# Patient Record
Sex: MALE | Race: WHITE
[De-identification: names, ages, dates, MRNs, and addresses within clinical notes are randomized per-mention and may not be internally consistent; named-entity substitution may affect disease eponyms.]

---

## 2018-09-21 ENCOUNTER — HOSPITAL ENCOUNTER (EMERGENCY)
Dept: HOSPITAL 25 - UCCORT | Age: 73
Discharge: HOME HEALTH | End: 2018-09-21
Payer: MEDICARE

## 2018-09-21 VITALS — SYSTOLIC BLOOD PRESSURE: 111 MMHG | DIASTOLIC BLOOD PRESSURE: 62 MMHG

## 2018-09-21 DIAGNOSIS — Z87.891: Primary | ICD-10-CM

## 2018-09-21 PROCEDURE — 74176 CT ABD & PELVIS W/O CONTRAST: CPT

## 2018-09-21 PROCEDURE — 76705 ECHO EXAM OF ABDOMEN: CPT

## 2018-09-21 PROCEDURE — 99212 OFFICE O/P EST SF 10 MIN: CPT

## 2018-09-21 PROCEDURE — G0463 HOSPITAL OUTPT CLINIC VISIT: HCPCS

## 2018-09-21 PROCEDURE — 81003 URINALYSIS AUTO W/O SCOPE: CPT

## 2018-09-21 NOTE — RAD
Indication: Right upper quadrant pain.



Real-time sonography of the right upper quadrant was performed.



The liver measures 14.6 cm in length. No focal lesions or intrahepatic duct dilatation is

noted. The gallbladder demonstrates no calcified gallstones. No pericholecystic fluid or

wall thickening is noted.



The right kidney measures 13.4 x 6.2 x 6.3 cm with moderate degree of right

hydronephrosis. A definitive calculi is not identified.



The pancreas head, neck and proximal body demonstrates no mass or pancreatic duct

dilatation. Aorta and inferior vena cava are unremarkable. Ureteral jets are not

identified in the urinary bladder.



IMPRESSION: No evidence of cholelithiasis or biliary duct dilatation.



Moderate right hydronephrosis and hydroureter. Ureteral jets are identified in the urinary

bladder.

## 2018-09-21 NOTE — RAD
INDICATION: RIGHT hydronephrosis on ultrasound. Question stone.



COMPARISON: September 21, 2018 ultrasound.



TECHNIQUE: Multidetector CT images were obtained from the lung bases to the ischial

tuberosities. No oral contrast administered.  Assessment of the visceral limited without

IV contrast. Multiplanar reformation.



REPORT: 



VISUALIZED INFERIOR THORAX: Small calcified granuloma at the RIGHT lower lobe. Mild RIGHT

basilar linear atelectasis.



LIVER / GALLBLADDER / PANCREAS / SPLEEN: Unremarkable unenhanced liver, gallbladder,

spleen. Mildly atrophic pancreas without suspicious finding.



ALIMENTARY TRACT: No CT abnormality of the upper GI or small bowel. The appendix is not

visualized. Mild diverticulosis of the colon without findings of acute diverticulitis.

Small volume of free pelvic fluid. Negative for free air. Small fat-containing umbilical

and bilateral inguinal hernias without inflammatory change.



MESENTERIC: Unremarkable.



ADRENAL / GENITOURINARY: Normal adrenal glands. Severe RIGHT hydroureteronephrosis is

traced 0.5 cm proximal RIGHT ureteral stone at the L4 level. Moderate RIGHT perinephric

and periureteral inflammatory stranding. Moderate LEFT pelvicaliectasis appears secondary

to chronic ureteropelvic junction stenosis. Negative for LEFT perinephric stranding.

Partially distended urinary bladder with multiple dependent stones measuring up to 1.3 cm.

Prostatomegaly and asymmetric prominence of the RIGHT seminal vesicle with lobular

contour. 



RETROPERITONEAL: Negative for lymphadenopathy.



VASCULAR: Negative for aortoiliac aneurysm. Physiologic partial distention of the IVC.

 

BONES: Negative for suspicious focal osseous lesions. Polyarticular degenerative

arthropathy. Grade 1 degenerative L1-L2, L3-L4 retrolisthesis and grade 1 degenerative

L4-L5 anterolisthesis.



SOFT TISSUE: Unremarkable. 



IMPRESSION: 

#. Severe RIGHT hydroureteronephrosis is traced 0.5 cm proximal RIGHT ureteral stone at

the L4 level. 

#. Moderate LEFT pelvicaliectasis appears secondary to chronic ureteropelvic junction

stenosis. 

#. Partially distended urinary bladder with multiple dependent stones measuring up to 1.3

cm. 

#. Prostatomegaly and asymmetric prominence of the RIGHT seminal vesicle with lobular

contour. Correlate with clinical assessment.

## 2018-09-21 NOTE — UC
Abdominal Pain Male HPI





- HPI Summary


HPI Summary: 





Pt presetns with 3 days right side abdominal pain, nause and vomiting. Pt 

states yesterday ate pasta at noon. at 3pm pt with increased pain and vomiting. 

States settled last evening and ate again at midnight. This am mild nausea, no 

vomiting. Pt with back pain and abdominal pain. no fever, chills. No cp, sob. 

no diarrhea, no hematuria no h//o similar


 


pt does not ahve PCP


No meds


pt took Tylenol pm with little improvement








- History of Current Complaint


Chief Complaint: UCGeneralIllness


Stated Complaint: RIGHT SIDE PAIN,STOMACCH ACHE,LOSS OF APPETITE


Time Seen by Provider: 18 08:06


Pain Intensity: 5





- Allergies/Home Medications


Allergies/Adverse Reactions: 


 Allergies











Allergy/AdvReac Type Severity Reaction Status Date / Time


 


No Known Allergies Allergy   Verified 18 07:51











Home Medications: 


 Home Medications





Acetaminophen/Diphenhydramine [Acetaminophen/Diphenhydra  mg] 1 tab PO 

Q6H PRN 18 [History Confirmed 18]


O.T.C. Prostate Supplement 1 tab PO DAILY 18 [History]











PMH/Surg Hx/FS Hx/Imm Hx


Previously Healthy: Yes - no pcp





- Surgical History


Surgical History: None





- Family History


Known Family History: 


   Negative: Hypertension, Diabetes





- Social History


Occupation: Retired


Lives: Alone


Alcohol Use: None


Substance Use Type: None


Smoking Status (MU): Former Smoker


Length of Time of Smoking/Using Tobacco: Some Day Smoker for 3 Years


When Did the Patient Quit Smoking/Using Tobacco: ~





- Immunization History


Most Recent Tetanus Shot: 13





Review of Systems


Constitutional: Negative


Gastrointestinal: Abdominal Pain, Vomiting, Other - right cva


All Other Systems Reviewed And Are Negative: Yes





Physical Exam





- Summary


Physical Exam Summary: 





Vital Signs Reviewed: Yes


A+Ox3, no distress


Eyes: Conjunctiva Clear, HARDIK. EOM intact and full


ENT: Hearing grossly normal  TM x 2 clear, mmoist, uvula midline, no exudate, 

no erythema


Neck: Positive: Supple


Respiratory: Positive: No respiratory distress, No accessory muscle use + CTA 

throughout  no w/r


Cardiovascular: RRR  nl s1, s2  no m/r  CBT <2  sec


abd soft + BS nd +TTP RUQ no guarding, no rebound, no CVA


Musculoskeletal Exam: RIVERA x 4 without difficulty Strength Intact, ROM Intact


Neurological: Positive: Alert,  + sensation throughout


Psychological: Positive: Normal Response To Family


Skin: Positive: no rash, no ecchymosis


Triage Information Reviewed: Yes


Vital Signs: 


 Initial Vital Signs











Temp  98.4 F   18 07:47


 


Pulse  88   18 07:47


 


Resp  18   18 07:47


 


BP  115/84   18 07:47


 


Pulse Ox  98   18 07:47














Diagnostics





- Radiology


  ** No standard instances


Radiology Interpretation Completed By: Radiologist - Patient Name:         

DANNY BACH                                                               

  Medical Record#: [id number] Ordering Physician: Rena Marshall MD             

                                                      Acct.#: [id number] :

     [id number]         Age: 73   Sex: M                                       

                    Location: URGENT CARE [facility] Exam Date: 18  

                                                                             

ADM Status: REG ER Order Information:                         US ABDOMEN 

LIMITED Accession Number:                          [id number] CPT:            

                           44060 Indication: Right upper quadrant pain.  Real-

time sonography of the right upper quadrant was performed.  The liver measures 

14.6 cm in length. No focal lesions or intrahepatic duct dilatation is noted. 

The gallbladder demonstrates no calcified gallstones. No pericholecystic fluid 

or wall thickening is noted.  The right kidney measures 13.4 x 6.2 x 6.3 cm 

with moderate degree of right hydronephrosis. A definitive calculi is not 

identified.  The pancreas head, neck and proximal body demonstrates no mass or 

pancreatic duct dilatation. Aorta and inferior vena cava are unremarkable. 

Ureteral jets are not identified in the urinary bladder.  IMPRESSION: No 

evidence of cholelithiasis or biliary duct dilatation.  Moderate right 

hydronephrosis and hydroureter. Ureteral jets are identified in the urinary 

bladder.  ____________________________________________________________ <

Electronically signed by Patsy Dietz MD in OV>  18 Dictated By: Patsy Dietz MD Dictated Date/Time: 18 Transcribed Date/Time: 18 Copy to:    CC:Rena Marshall MD; No Primary Care Phys,NOPCP Imaging - [facility]                                 Imaging - [facility]            

                         Imaging - [facility]  [address]       

                                [id number] [place] [id number] ph ([phone])                                     ph ([phone])                                                ph ([phone])    

      This report is only to be considered final once signed by the Provider(s) 

as displayed in the "<Electronically Signed by >" field (s). Absence of a  

signature indicates the report is in a draft status and still needs to be 

finalized. In the event this document was created by someone other than the  

signing Provider, the individual initiating the document will be listed in the 

"Entered by:" or "Dictated by:" fields.                                        

                          1 of 





Re-Evaluation





- Re-Evaluation


  ** First Eval


Comment: reviewed us with pt.  will check ct.  pt continues to decline 

analgeisa.  nausea improved





  ** Second Eval


Comment: reviewed CT.  pt with hydro b/l kidneys.  spoke with urology  

Rufino.  will send pt to ED at [facility] - pt refusing [facility].  will checkc BMP.  Dr. Hamilton accepting.  Pt in agreement





Abd Pain Male Course/Dx





- Course


Course Of Treatment: Pt with 3 days progressive abd pain, nausea no fevers.  pt 

with RUQ pain on exam.  eill check urine and ultrasound.  will give zofran.  pt 

declined analgesia





- Differential Dx/Clinical Impression


Provider Diagnoses: renal colic.  hydronephrosis





Discharge





- Sign-Out/Discharge


Documenting (check all that apply): Patient Departure


All imaging exams completed and their final reports reviewed: Yes





- Discharge Plan


Condition: Stable


Disposition: HOME-RECOMMEND TO ED


Patient Education Materials:  Kidney Stones (ED), Hydronephrosis (ED)


Referrals: 


No Primary Care Phys,NOPCP [Primary Care Provider] - 


Additional Instructions: 


The doctor that evaluated you today thinks that you need additional testing 

that can be completed the emergency department.  It is recommended that you go 

directly to emergency department for further evaluation. They are expecting 

you.   This evaluation included blood work  and discussion with a kidney stone 

specialist.  This testing will be directed and decided by the provider that 

evaluats  you at the emergency department.  If pain becomes worse, you feel 

lightheaded, you have uncontrolled vomiting, or you have any other concerns 

while you are being driven to emergency department as recommended to pullover 

and contact 911.





- Billing Disposition and Condition


Condition: STABLE


Disposition: Home-Recommend to ED